# Patient Record
(demographics unavailable — no encounter records)

---

## 2024-10-07 NOTE — HISTORY OF PRESENT ILLNESS
[FreeTextEntry1] : Very pleasant 78-year-old gentleman presents for follow-up of kidney stones.  He was last seen approximately 2 years ago.  He was previously noted to have a distal left ureteral stone as well as indeterminate renal lesions.  He did not see a stone pass, however he subsequently underwent a CT scan which demonstrated a number of small left intrarenal stones but no ureteral stones.  Most recent renal ultrasound from March 2022 demonstrated stones measuring up to 1 cm.  Because of this he underwent a repeat CT scan October 2022 which demonstrates small bilateral nonobstructing intrarenal stones.  No other complaints.  He reports that he underwent a renal ultrasound recently with his primary care physician, however the results of this are not yet available.  He reports nocturia up to 3-4 times per night recently, however this is since decreased to 2 times per night.  He is happy with urinary symptoms.

## 2024-10-07 NOTE — PHYSICAL EXAM
[Well Groomed] : well groomed [Normal Appearance] : normal appearance [General Appearance - In No Acute Distress] : no acute distress [Edema] : no peripheral edema [Respiration, Rhythm And Depth] : normal respiratory rhythm and effort [Exaggerated Use Of Accessory Muscles For Inspiration] : no accessory muscle use [Abdomen Soft] : soft [Abdomen Tenderness] : non-tender [Costovertebral Angle Tenderness] : no ~M costovertebral angle tenderness [Urinary Bladder Findings] : the bladder was normal on palpation [Normal Station and Gait] : the gait and station were normal for the patient's age [] : no rash [No Focal Deficits] : no focal deficits [Oriented To Time, Place, And Person] : oriented to person, place, and time [Affect] : the affect was normal [Mood] : the mood was normal [No Palpable Adenopathy] : no palpable adenopathy

## 2024-10-07 NOTE — ASSESSMENT
[FreeTextEntry1] : Very pleasant 78-year-old gentleman who presents for follow-up of kidney stones, urinary urgency, history of prostate cancer, nocturia -We discussed potential etiologies of urinary urgency and nocturia -Urinalysis -Urine culture -PSA -Request records regarding renal ultrasound for history of stones -We discussed the option to start a medication for urinary urgency and nocturia.  He is happy with his urinary symptoms currently and would like to observe with which I agree -Follow-up in 6 months if urine studies and PSA are unremarkable  Patient is being seen today for evaluation and management of a chronic and longitudinal ongoing condition and I am of the primary treating physician

## 2024-12-20 NOTE — HISTORY OF PRESENT ILLNESS
[Home] : at home, [unfilled] , at the time of the visit. [Medical Office: ([facility])___] : at the medical office located in  [FreeTextEntry1] : The patient-doctor relationship has been established via real time audio communication using telemedicine software.  He has requested care to be assessed and treated through telemedicine. He understands that there may be limitations in this process and that he may need further follow up care in the office and/or hospital setting.  Very pleasant 78-year-old gentleman who presents for follow-up of urinary urgency, nocturia, BPH, history of prostate cancer.  He reports nocturia up to 3-4 times per night.  This is very bothersome.  He is interested in trying a medication for this at this time, however does not wish to take a medication long-term if possible.

## 2024-12-20 NOTE — ASSESSMENT
[FreeTextEntry1] : Very pleasant 78-year-old gentleman who presents for follow-up of urinary urgency, nocturia, history of prostate cancer, history of kidney stones -PSA 0.11 -Urine culture negative -Urinalysis demonstrates 1 red blood cell per high-powered field -Trial of tamsulosin -I discussed the risks, benefits, alternatives, and possible side effects of alpha blocker therapy with the patient, including but not limited to dizziness, lightheadedness, headache, blurred vision, retrograde ejaculation, and priapism with the patient. I also discussed that the patient must inform his ophthalmologist that he has taken an alpha blocker prior to undergoing cataract or glaucoma surgery. -Follow-up in 1 month with telehealth visit to assess efficacy of tamsulosin  Patient is being seen today for evaluation and management of a chronic and longitudinal ongoing condition and I am the primary treating physician

## 2025-01-22 NOTE — REASON FOR VISIT
[Follow-up Visit ___] : a follow-up visit  for [unfilled] Fall Precautions -  Patient is a parapalegic Fall Precautions -  Patient is a paraplegic

## 2025-01-22 NOTE — ASSESSMENT
[FreeTextEntry1] : Very pleasant 78-year-old gentleman who presents for follow-up of urinary urgency, nocturia, history of prostate cancer, history of kidney stones -PSA 0.11 -Urine culture negative -Urinalysis demonstrates 1 red blood cell per high-powered field -Trial off of tamsulosin; refill sent to the pharmacy in case he needs to restart it -He will restart the medication immediately if his urinary symptoms worsen off of the medication -He is considering being a kidney donor.  We discussed that he should undergo evaluation, however we discussed that his risk of kidney stones may preclude him from being a kidney donor. -Follow-up in 6 months   Patient is being seen today for evaluation and management of a chronic and longitudinal ongoing condition and I am the primary treating physician

## 2025-01-22 NOTE — HISTORY OF PRESENT ILLNESS
[Home] : at home, [unfilled] , at the time of the visit. [Medical Office: ([facility])___] : at the medical office located in  [Verbal consent obtained from patient] : the patient, [unfilled] [FreeTextEntry1] : The patient-doctor relationship has been established via real time audio communication using telemedicine software.  He has requested care to be assessed and treated through telemedicine. He understands that there may be limitations in this process and that he may need further follow up care in the office and/or hospital setting.  Very pleasant 78-year-old gentleman who presents for follow-up of urinary urgency, nocturia, BPH, history of prostate cancer.  He reports nocturia up to 3-4 times per night recently for which he was started on tamsulosin. Reports significant improvement in his urinary symptoms on the medication, down to nocturia x 1-2. He took his last dose of the medication two nights ago. He would like to try a trial off of the medication.

## 2025-01-22 NOTE — PHYSICAL EXAM
[General Appearance - Well Developed] : well developed [General Appearance - Well Nourished] : well nourished [Oriented To Time, Place, And Person] : oriented to person, place, and time [Not Anxious] : not anxious

## 2025-03-03 NOTE — ASSESSMENT
[FreeTextEntry1] : Very pleasant 78-year-old gentleman who presents for follow-up of urinary urgency, nocturia, history of prostate cancer, history of kidney stones -PSA 0.11 -Urine culture negative -Urinalysis demonstrates 1 red blood cell per high-powered field -Off tamsulosin -CT at this time for kidney stones and to assess his ability to donate a kidney -F/U in 3 weeks   Patient is being seen today for evaluation and management of a chronic and longitudinal ongoing condition and I am the primary treating physician

## 2025-03-03 NOTE — HISTORY OF PRESENT ILLNESS
[FreeTextEntry1] : Very pleasant 79-year-old gentleman who presents for follow-up of urinary urgency, nocturia, BPH, history of prostate cancer.  He reports nocturia up to 3-4 times per night recently for which he was started on tamsulosin. Reports significant improvement in his urinary symptoms on the medication, down to nocturia x 1-2.  He elected to try a trial off of the medication. He reports that he is urinating well off of the medication. He is considering being a kidney donor for his sister-in-law.

## 2025-07-21 NOTE — ASSESSMENT
[FreeTextEntry1] : Very pleasant 78-year-old gentleman who presents for follow-up of urinary urgency, nocturia, history of prostate cancer, history of kidney stones -PSA 0.11 -Urine culture negative -Urinalysis demonstrates 1 red blood cell per high-powered field -Remain off of tamsulosin -PSA today -F/U in 6 months   Patient is being seen today for evaluation and management of a chronic and longitudinal ongoing condition and I am the primary treating physician

## 2025-07-21 NOTE — HISTORY OF PRESENT ILLNESS
[FreeTextEntry1] : Very pleasant 79-year-old gentleman who presents for follow-up of urinary urgency, nocturia, BPH, history of prostate cancer.  He previously took tamsulosin but stopped in favor of a more holistic approach. Nocturia x 3-4 up from 1-2.  He reports that he is urinating well off of the medication. He denies flank pain at this time. He was considering being a kidney donor for his sister-in-law, however was ruled out as a potential donor.